# Patient Record
Sex: MALE | Race: WHITE | NOT HISPANIC OR LATINO | Employment: FULL TIME | URBAN - METROPOLITAN AREA
[De-identification: names, ages, dates, MRNs, and addresses within clinical notes are randomized per-mention and may not be internally consistent; named-entity substitution may affect disease eponyms.]

---

## 2019-03-22 ENCOUNTER — HOSPITAL ENCOUNTER (OUTPATIENT)
Dept: URGENT CARE | Facility: CLINIC | Age: 44
Discharge: HOME | End: 2019-03-22
Attending: PHYSICIAN ASSISTANT

## 2019-03-25 LAB
AMOXICILLIN+CLAV SUSC ISLT: <=2
AMPICILLIN SUSC ISLT: <=2
AMPICILLIN+SULBAC SUSC ISLT: <=2
BACTERIA UR CULT: ABNORMAL
CEFAZOLIN SUSC ISLT: <=4
CEFEPIME SUSC ISLT: <=1
CEFTAZIDIME SUSC ISLT: <=1
CEFTRIAXONE SUSC ISLT: <=1
CEFUROXIME ORAL SUSC ISLT: 4
CEFUROXIME PARENTER SUSC ISLT: 4
CIPROFLOXACIN SUSC ISLT: <=0.25
ERTAPENEM SUSC ISLT: <=0.5
GENTAMICIN SUSC ISLT: <=1
LEVOFLOXACIN SUSC ISLT: <=0.12
NITROFURANTOIN SUSC ISLT: <=16
TETRACYCLINE SUSC ISLT: <=1
TMP SMX SUSC ISLT: <=20
TOBRAMYCIN SUSC ISLT: <=1

## 2020-09-30 ENCOUNTER — HOSPITAL ENCOUNTER (OUTPATIENT)
Dept: LAB | Facility: HOSPITAL | Age: 45
Discharge: HOME | End: 2020-09-30
Attending: NURSE PRACTITIONER

## 2020-09-30 LAB
ALBUMIN SERPL-MCNC: 4.4 G/DL (ref 3.5–5)
ALBUMIN/GLOB SERPL: 1.5 {RATIO} (ref 1.4–2.6)
ALP SERPL-CCNC: 76 U/L (ref 53–128)
ALT SERPL-CCNC: 16 U/L (ref 10–40)
ANION GAP SERPL CALC-SCNC: 16 MMOL/L (ref 8–19)
AST SERPL-CCNC: 18 U/L (ref 15–50)
BASOPHILS # BLD AUTO: 0.04 10*3/UL (ref 0–0.2)
BASOPHILS NFR BLD AUTO: 0.6 % (ref 0–3)
BILIRUB SERPL-MCNC: 0.35 MG/DL (ref 0.2–1.3)
BUN SERPL-MCNC: 15 MG/DL (ref 5–25)
BUN/CREAT SERPL: 9 {RATIO} (ref 6–20)
CALCIUM SERPL-MCNC: 9.2 MG/DL (ref 8.7–10.4)
CHLORIDE SERPL-SCNC: 104 MMOL/L (ref 99–111)
CHOLEST SERPL-MCNC: 238 MG/DL (ref 107–200)
CHOLEST/HDLC SERPL: 7 {RATIO} (ref 3–6)
CONV ABS IMM GRAN: 0.01 10*3/UL (ref 0–0.2)
CONV CO2: 23 MMOL/L (ref 22–32)
CONV IMMATURE GRAN: 0.2 % (ref 0–1.8)
CONV TOTAL PROTEIN: 7.3 G/DL (ref 6.3–8.2)
CREAT UR-MCNC: 1.59 MG/DL (ref 0.7–1.2)
DEPRECATED RDW RBC AUTO: 43.6 FL (ref 35.1–43.9)
EOSINOPHIL # BLD AUTO: 0.15 10*3/UL (ref 0–0.7)
EOSINOPHIL # BLD AUTO: 2.3 % (ref 0–7)
ERYTHROCYTE [DISTWIDTH] IN BLOOD BY AUTOMATED COUNT: 14 % (ref 11.6–14.4)
GFR SERPLBLD BASED ON 1.73 SQ M-ARVRAT: 52 ML/MIN/{1.73_M2}
GLOBULIN UR ELPH-MCNC: 2.9 G/DL (ref 2–3.5)
GLUCOSE SERPL-MCNC: 94 MG/DL (ref 70–99)
HCT VFR BLD AUTO: 47 % (ref 42–52)
HDLC SERPL-MCNC: 34 MG/DL (ref 40–60)
HGB BLD-MCNC: 15 G/DL (ref 14–18)
LDLC SERPL CALC-MCNC: 168 MG/DL (ref 70–100)
LYMPHOCYTES # BLD AUTO: 1.49 10*3/UL (ref 1–5)
LYMPHOCYTES NFR BLD AUTO: 22.4 % (ref 20–45)
MCH RBC QN AUTO: 27.6 PG (ref 27–31)
MCHC RBC AUTO-ENTMCNC: 31.9 G/DL (ref 33–37)
MCV RBC AUTO: 86.6 FL (ref 80–96)
MONOCYTES # BLD AUTO: 0.6 10*3/UL (ref 0.2–1.2)
MONOCYTES NFR BLD AUTO: 9 % (ref 3–10)
NEUTROPHILS # BLD AUTO: 4.37 10*3/UL (ref 2–8)
NEUTROPHILS NFR BLD AUTO: 65.5 % (ref 30–85)
NRBC CBCN: 0 % (ref 0–0.7)
OSMOLALITY SERPL CALC.SUM OF ELEC: 289 MOSM/KG (ref 273–304)
PLATELET # BLD AUTO: 242 10*3/UL (ref 130–400)
PMV BLD AUTO: 9.8 FL (ref 9.4–12.4)
POTASSIUM SERPL-SCNC: 4.4 MMOL/L (ref 3.5–5.3)
RBC # BLD AUTO: 5.43 10*6/UL (ref 4.7–6.1)
SODIUM SERPL-SCNC: 139 MMOL/L (ref 135–147)
TRIGL SERPL-MCNC: 182 MG/DL (ref 40–150)
TSH SERPL-ACNC: 2.05 M[IU]/L (ref 0.27–4.2)
VLDLC SERPL-MCNC: 36 MG/DL (ref 5–37)
WBC # BLD AUTO: 6.66 10*3/UL (ref 4.8–10.8)

## 2021-08-09 ENCOUNTER — OFFICE VISIT (OUTPATIENT)
Dept: SLEEP MEDICINE | Facility: HOSPITAL | Age: 46
End: 2021-08-09

## 2021-08-09 VITALS
HEART RATE: 62 BPM | SYSTOLIC BLOOD PRESSURE: 102 MMHG | DIASTOLIC BLOOD PRESSURE: 74 MMHG | WEIGHT: 163 LBS | BODY MASS INDEX: 26.2 KG/M2 | OXYGEN SATURATION: 98 % | HEIGHT: 66 IN

## 2021-08-09 DIAGNOSIS — G47.10 HYPERSOMNIA: ICD-10-CM

## 2021-08-09 DIAGNOSIS — R06.83 SNORING: ICD-10-CM

## 2021-08-09 DIAGNOSIS — G47.8 NON-RESTORATIVE SLEEP: ICD-10-CM

## 2021-08-09 DIAGNOSIS — G47.30 OBSERVED SLEEP APNEA: Primary | ICD-10-CM

## 2021-08-09 PROCEDURE — 99244 OFF/OP CNSLTJ NEW/EST MOD 40: CPT | Performed by: INTERNAL MEDICINE

## 2021-08-09 RX ORDER — BUSPIRONE HYDROCHLORIDE 5 MG/1
TABLET ORAL
COMMUNITY
Start: 2021-07-27

## 2021-08-09 RX ORDER — TRAZODONE HYDROCHLORIDE 50 MG/1
TABLET ORAL
COMMUNITY
Start: 2021-06-21

## 2021-08-09 RX ORDER — FENOFIBRATE 145 MG/1
TABLET, COATED ORAL
COMMUNITY
Start: 2021-06-21

## 2021-08-09 RX ORDER — METOPROLOL SUCCINATE 25 MG/1
TABLET, EXTENDED RELEASE ORAL
COMMUNITY
Start: 2021-06-23

## 2021-08-11 ENCOUNTER — HOSPITAL ENCOUNTER (OUTPATIENT)
Dept: SLEEP MEDICINE | Facility: HOSPITAL | Age: 46
Discharge: HOME | End: 2021-08-11
Admitting: INTERNAL MEDICINE

## 2021-08-11 DIAGNOSIS — G47.30 OBSERVED SLEEP APNEA: ICD-10-CM

## 2021-08-11 DIAGNOSIS — G47.8 NON-RESTORATIVE SLEEP: ICD-10-CM

## 2021-08-11 DIAGNOSIS — G47.10 HYPERSOMNIA: ICD-10-CM

## 2021-08-11 DIAGNOSIS — R06.83 SNORING: ICD-10-CM

## 2021-08-11 PROCEDURE — 95806 SLEEP STUDY UNATT&RESP EFFT: CPT

## 2021-08-11 PROCEDURE — 95806 SLEEP STUDY UNATT&RESP EFFT: CPT | Performed by: INTERNAL MEDICINE

## 2021-08-19 ENCOUNTER — TELEPHONE (OUTPATIENT)
Dept: SLEEP MEDICINE | Facility: HOSPITAL | Age: 46
End: 2021-08-19

## 2021-09-15 ENCOUNTER — OFFICE VISIT (OUTPATIENT)
Dept: SLEEP MEDICINE | Facility: HOSPITAL | Age: 46
End: 2021-09-15

## 2021-09-15 VITALS
HEIGHT: 66 IN | HEART RATE: 56 BPM | DIASTOLIC BLOOD PRESSURE: 69 MMHG | TEMPERATURE: 96.4 F | SYSTOLIC BLOOD PRESSURE: 107 MMHG | OXYGEN SATURATION: 97 % | BODY MASS INDEX: 25.88 KG/M2 | WEIGHT: 161 LBS

## 2021-09-15 DIAGNOSIS — G47.8 NON-RESTORATIVE SLEEP: ICD-10-CM

## 2021-09-15 DIAGNOSIS — R06.83 SNORING: Primary | ICD-10-CM

## 2021-09-15 PROBLEM — G47.10 HYPERSOMNIA: Status: RESOLVED | Noted: 2021-08-09 | Resolved: 2021-09-15

## 2021-09-15 PROBLEM — G47.30 OBSERVED SLEEP APNEA: Status: RESOLVED | Noted: 2021-08-09 | Resolved: 2021-09-15

## 2021-09-15 PROCEDURE — 99212 OFFICE O/P EST SF 10 MIN: CPT | Performed by: INTERNAL MEDICINE

## 2021-09-15 PROCEDURE — G0463 HOSPITAL OUTPT CLINIC VISIT: HCPCS

## 2021-09-15 NOTE — PROGRESS NOTES
"  [address] [address]  Phone: [phone]  Fax: [phone]      SLEEP CLINIC FOLLOW UP PROGRESS NOTE.    Refugio Yarbrough  1975  45 y.o.  male      PCP: Roland Clark APRN      Date of visit: 9/15/2021    Chief Complaint   Patient presents with   • Snoring       HPI:  This is a 45 y.o. years old patient who has a history of snoring and other symptoms of sleep apnea was evaluated recently and underwent sleep test.  Patient is here to discuss the test results and treatment options.  I have discussed with the patient the test results.  To summarize the test shows no evidence of sleep apnea.  The AHI(apnea-hypopnea index) is 2.5 /hr, less than 5.0 is normal.  But the test showed snoring for 68% of sleep time with 252 episodes.  No evidence of oxygen desaturation.     Medication and allergies are reviewed by me and documented in the encounter.     SOCIAL ( habits pertaining to sleep medicine)  History of smoking:No   History of alcohol use: 0 per week  Caffeine use: 2     REVIEW OF SYSTEMS:   [place] Sleepiness Scale :Total score: 6   Snoring: Yes   Nasal congestion:No       PHYSICAL EXAMINATION:  CONSTITUTIONAL:  Vitals:    09/15/21 0900   BP: 107/69   Pulse: 56   Temp: 96.4 °F (35.8 °C)   SpO2: 97%   Weight: 73 kg (161 lb)   Height: 167.6 cm (66\")    Body mass index is 25.99 kg/m².  NOSE: nasal passages are clear, no nasal polyps, septum in the midline.  THROAT: throat is clear, oral airway Mallampati class 3      ASSESSMENT AND PLAN:  · Snoring R 06.83 /upper airway resistance syndrome G 47.39  · Advised to sleep on the side, patient can use body pillow to prevent rollover  · Sleep hygiene, with the regular sleep time and wake time  · Advised to avoid alcohol and other sedative medications  · Patient can also use oral appliance.  I have given information about the oral appliance.  · Nonrestorative sleep secondary to fragmented sleep and " snoring  · Return if symptoms worsen or fail to improve. . Patient's questions were answered.        Ayala Rooney MD  Sleep Medicine  Medical Director for [facility]  9/15/2021

## 2022-01-10 NOTE — PROGRESS NOTES
[facility] [address]  [place]: [address] [place] [address]  Phone: [phone]  Fax; [phone]      Refugio LOPEZ Linnea  1975  45 y.o.  male      Referring physician/provider and PCP Roland Clark APRN    Type of service: Initial Sleep Medicine Consult.  Date of service: 8/9/2021      Chief Complaint   Patient presents with   • Witnessed Apnea   • Snoring   • Fatigue   • Non-restorative Sleep   • Daytime Sleepiness       History of present illness;  Thank you for asking to see Refugio LOPEZ Linnea, 45 y.o.. The patient was seen today on 8/9/2021 at [facility].  The patient presents today with symptoms of snoring, nonrestorative sleep and witnessed apneas. The symptoms are present for 10+ years and they are persistent in nature.  The snoring is present in all positions and it is loud.  Has  history of prior sleep evaluation and sleep studies. Patient hs no prior surgery namely tonsillectomy, nasal surgery and UPPP.   He continues to snore and his wife is worried about him.  He says that when he wakes up he is not rested.  He works as a  mainly office job.    Patient gives the following sleep history.  Sleep schedule:  Bedtime: 10:30 PM  Wake time: 7 AM  Normally takes about 15-20 minutes to fall asleep  Average hours of sleep 8-9  Number of naps per day none  Symptoms  In addition to snoring, nonrestorative sleep and witnessed apneas patient gives the following associated symptoms.  Have you ever awakened gasping for breath, coughing, choking: Yes   Change in weight,  Yes gained about 20 pounds  Morning headaches  No   Awaken with a sore throat or dry mouth  Yes   Leg jerking at night:  No   Crawly feeling/urge sensation to move in the legs: No   Teeth grinding:No   Have you ever awakened at night with a sour taste or burning sensation in your chest:  No   Do you have muscle weakness with laughing or anger or sleep paralysis:  No   Have you ever felt  "paralyzed while going to sleep or waking up:  No   Sleepwalking, nightmares, No   Nocturia (urination at night): 1 times per night  Memory Problem:No     MEDICAL CONDITIONS (PMH)  1. Hypertension  2. Depression  3. Hyperlipidemia  4. Insomnia  5. 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 Diabetes mellitus  2. Heart disease  3. Hypertension  4. Sleep apnea  5. Non-Hodgkin's lymphoma    Medications: reviewed    Review of systems:  Sleep: Positve for snoring,witnessed apnea and daytime excessive sleepiness with [place] Sleepiness Scale of Total score: 8   Kidney/ Bladder  Difficulty In Urination: negative  Bed Wetting: negative  Frequent Urination: negative  HEENT  Recurrent Nose Bleeds: negative  Ear pain: negative  Sores In Mouth: negative  Persistent Hoarseness: negative  Nasal Congestion: negative  Post Nasal Drip: negative  Musculoskeletal:  Neck Pain: negative  Temporomandibular Joint Pain: negative  General:  Fever: negative  Fatigue: positive  Vardiovascular:  Irregular Heartbeat: negative  Swollen Ankles Or Legs: negative  Respiratory:  Shortness Of Breath: negative  Wheezing: negative  Neuro/Paych:  Fainting Spells: negative  Dizziness: negative  Anxiety: negative  Depression: negative  Gastrointestinal:  Problem Swallowing: negative  Frequent Heartburn: negative  Abdominal Bloating: negative  Skin:  Rash: negative  Change In Nails: negative  Endocrine:  Excessive Thirst: negative  Always Too Cold: negative  Always Too Warm: negative  Hem/Lymphatic:  Swollen Glands: negative  Burses/ Bleeds Easily: negative    Physical exam:  CONSTITUTINONAL:  Vitals:    08/09/21 1100   BP: 102/74   Pulse: 62   SpO2: 98%   Weight: 73.9 kg (163 lb)   Height: 167.6 cm (66\")    " Body mass index is 26.31 kg/m².   HEAD: atraumatic, normocephalic   EYES:pupils are round, equal and reacting to light and accommodation, conjunctiva normal  NOSE:no nasal septal defects, nasal passages are clear, no nasal polyps,   THOART: tonsils are not enlarged, tongue normal size, oral airway Mallampati class 3  NECK:Neck Circumference: 16 inches, trachea is in the midline, thyroid not enlarged  RESPIRATORY SYSTEM: Breath sounds are normal, there are no wheezes  CARDIOVASULAR SYSTEM: Heart sounds are regular rhythm and normal rate, there are no murmurs or thrills, no edema  GASTROINTESTIAN: Soft and non-tender,liver not enlarged, no evidence of ascites  MUSCULOSKELETAL SYSTEM: Full range of motion's in all the 4 extremities, neck movement not restricted, temporomandibular joint movement normal and no tenderness  SKIN: Warm and moist, no cyanosis, no clubbing,  NEUROLOGICAL SYSTEM: Oriented x 3, no gross neurological defects, No speech defect, gait is normal  PYSCHATRIC SYSTEM: Mood is normal, thought content is normal    Labs reviewed.  TSH Results:  TSH    TSH 9/30/20   TSH 2.050                 Assessment and plan:  · Witnessed apneas,(R06.81) patient's symptoms and examination is consistent with sleep apnea (G47.30). I have talked to the patient about the signs and symptoms of sleep apnea. In addition, I have also discussed pathophysiology of sleep apnea.  I also discussed the complications of untreated sleep apnea including effects on hypertension, diabetes mellitus and nonrestorative sleep with hypersomnia which can increase risk for motor vehicle accidents.  Untreated sleep apnea is also a risk factor for development of atrial fibrillation, pulmonary hypertension and stroke.  Discussed in detail of various testing methods including home-based and lab based sleep studies.  Based on history and physical examination and other comorbidities the most appropriate study is home sleep test.  The order for the  sleep study is placed in [facility].  The test will be scheduled after approval from insurance. Treatment and management will be discussed after the test is completed.  Patient was given opportunity to ask questions and all the questions were answered.   · Snoring (R06.83), snoring is the sound created by turbulent airflow vibrating upper airway soft tissue due to limitation of inspiratory airflow. I have also discussed factors affecting snoring including sleep deprivation, sleeping on the back and alcohol ingestion. To minimize snoring, patient is advised to have adequate sleep, sleep on the side and avoid alcohol and sedative medications before bedtime  · Daytime excessive sleepiness .  It was assessed with [place] Sleepiness Scale of Total score: 8.  There are many causes for daytime excessive sleepiness including sleep depression, shiftwork syndrome, depression and other medical disorders including heart, kidney and liver failure.  The most serious cause of excessive sleepiness is due to neurological conditions like narcolepsy/cataplexy.  But the most common cause of excessive sleepiness is due to sleep apnea with frequent awakenings during sleep time.  I have discussed safety of driving and to remain vigilant while driving.    I have also discussed with the patient the following  • Sleep hygiene: Maintaining a regular bedtime and wake time, not to watch television or work in bed, limit caffeine-containing beverages before bed time and avoid naps during the day  • Adequate amount of sleep.  Generally most people needs about 7 to 8 hours of sleep.    Return for 31 to 90 days after PAP setup with down load..  Patient's questions were answered      I once again thank you for asking me to see this patient in consultation and I have forwarded my opinion and treatment plan.  Please do not hesitate to call me if you have any questions.     Ayala Rooney MD  Sleep Medicine  Medical Director, [facility] and  [facility]  8/9/2021 ,     [Healthy Appearing] : healthy appearing [Well Nourished] : well nourished [Interactive] : interactive [Well formed] : well formed [Normally Set] : normally set [Normal S1 and S2] : normal S1 and S2 [Clear to Ausculation Bilaterally] : clear to auscultation bilaterally [Abdomen Soft] : soft [Abdomen Tenderness] : non-tender [] : no hepatosplenomegaly [Normal] : normal  [2] : was Blaine stage 2 [Normal Appearance] : normal in appearance [Blaine Stage ___] : the Blaine stage for breast development was [unfilled] [Moderate] : moderate [Murmur] : no murmurs

## 2022-06-20 ENCOUNTER — TRANSCRIBE ORDERS (OUTPATIENT)
Dept: ADMINISTRATIVE | Facility: HOSPITAL | Age: 47
End: 2022-06-20

## 2022-06-20 DIAGNOSIS — N23 KIDNEY PAIN: Primary | ICD-10-CM

## 2022-07-08 ENCOUNTER — APPOINTMENT (OUTPATIENT)
Dept: ULTRASOUND IMAGING | Facility: HOSPITAL | Age: 47
End: 2022-07-08

## 2023-05-26 ENCOUNTER — LAB (OUTPATIENT)
Dept: LAB | Facility: HOSPITAL | Age: 48
End: 2023-05-26
Payer: COMMERCIAL

## 2023-05-26 ENCOUNTER — TRANSCRIBE ORDERS (OUTPATIENT)
Dept: ADMINISTRATIVE | Facility: HOSPITAL | Age: 48
End: 2023-05-26
Payer: COMMERCIAL

## 2023-05-26 ENCOUNTER — TRANSCRIBE ORDERS (OUTPATIENT)
Dept: ADMINISTRATIVE | Facility: HOSPITAL | Age: 48
End: 2023-05-26

## 2023-05-26 ENCOUNTER — HOSPITAL ENCOUNTER (OUTPATIENT)
Dept: CARDIOLOGY | Facility: HOSPITAL | Age: 48
Discharge: HOME | End: 2023-05-26
Payer: COMMERCIAL

## 2023-05-26 DIAGNOSIS — Z51.81 ENCOUNTER FOR THERAPEUTIC DRUG MONITORING: ICD-10-CM

## 2023-05-26 DIAGNOSIS — F90.0 ADHD, PREDOMINANTLY INATTENTIVE TYPE: ICD-10-CM

## 2023-05-26 DIAGNOSIS — Z79.899 ENCOUNTER FOR LONG-TERM (CURRENT) USE OF OTHER MEDICATIONS: ICD-10-CM

## 2023-05-26 DIAGNOSIS — Z51.81 ENCOUNTER FOR THERAPEUTIC DRUG MONITORING: Primary | ICD-10-CM

## 2023-05-26 LAB
AMPHET+METHAMPHET UR QL: NEGATIVE
BARBITURATES UR QL SCN: NEGATIVE
BENZODIAZ UR QL SCN: NEGATIVE
CANNABINOIDS SERPL QL: NEGATIVE
COCAINE UR QL: NEGATIVE
FENTANYL UR-MCNC: NEGATIVE NG/ML
METHADONE UR QL SCN: NEGATIVE
OPIATES UR QL: NEGATIVE
OXYCODONE UR QL SCN: NEGATIVE
QT INTERVAL: 367 MS

## 2023-05-26 PROCEDURE — 80307 DRUG TEST PRSMV CHEM ANLYZR: CPT

## 2023-05-26 PROCEDURE — 93005 ELECTROCARDIOGRAM TRACING: CPT | Performed by: NURSE PRACTITIONER

## 2023-06-05 LAB — QT INTERVAL: 367 MS

## 2024-08-08 ENCOUNTER — TRANSCRIBE ORDERS (OUTPATIENT)
Dept: ADMINISTRATIVE | Facility: HOSPITAL | Age: 49
End: 2024-08-08
Payer: COMMERCIAL

## 2024-08-08 ENCOUNTER — LAB (OUTPATIENT)
Dept: LAB | Facility: HOSPITAL | Age: 49
End: 2024-08-08
Payer: COMMERCIAL

## 2024-08-08 DIAGNOSIS — Z51.81 ENCOUNTER FOR THERAPEUTIC DRUG MONITORING: ICD-10-CM

## 2024-08-08 DIAGNOSIS — F90.9 ATTENTION DEFICIT HYPERACTIVITY DISORDER (ADHD), UNSPECIFIED ADHD TYPE: ICD-10-CM

## 2024-08-08 DIAGNOSIS — Z79.899 ENCOUNTER FOR LONG-TERM (CURRENT) USE OF OTHER MEDICATIONS: Primary | ICD-10-CM

## 2024-08-08 DIAGNOSIS — Z79.899 ENCOUNTER FOR LONG-TERM (CURRENT) USE OF OTHER MEDICATIONS: ICD-10-CM

## 2024-08-08 LAB
AMPHET+METHAMPHET UR QL: POSITIVE
BARBITURATES UR QL SCN: NEGATIVE
BENZODIAZ UR QL SCN: NEGATIVE
CANNABINOIDS SERPL QL: NEGATIVE
COCAINE UR QL: NEGATIVE
FENTANYL UR-MCNC: NEGATIVE NG/ML
METHADONE UR QL SCN: NEGATIVE
OPIATES UR QL: NEGATIVE
OXYCODONE UR QL SCN: NEGATIVE

## 2024-08-08 PROCEDURE — 80307 DRUG TEST PRSMV CHEM ANLYZR: CPT
